# Patient Record
Sex: MALE | Race: OTHER | URBAN - METROPOLITAN AREA
[De-identification: names, ages, dates, MRNs, and addresses within clinical notes are randomized per-mention and may not be internally consistent; named-entity substitution may affect disease eponyms.]

---

## 2017-07-13 ENCOUNTER — IMPORTED ENCOUNTER (OUTPATIENT)
Dept: URBAN - METROPOLITAN AREA CLINIC 50 | Facility: CLINIC | Age: 82
End: 2017-07-13

## 2017-07-17 ENCOUNTER — IMPORTED ENCOUNTER (OUTPATIENT)
Dept: URBAN - METROPOLITAN AREA CLINIC 50 | Facility: CLINIC | Age: 82
End: 2017-07-17

## 2018-09-20 ENCOUNTER — IMPORTED ENCOUNTER (OUTPATIENT)
Dept: URBAN - METROPOLITAN AREA CLINIC 50 | Facility: CLINIC | Age: 83
End: 2018-09-20

## 2018-09-27 ENCOUNTER — IMPORTED ENCOUNTER (OUTPATIENT)
Dept: URBAN - METROPOLITAN AREA CLINIC 50 | Facility: CLINIC | Age: 83
End: 2018-09-27

## 2018-10-12 ENCOUNTER — IMPORTED ENCOUNTER (OUTPATIENT)
Dept: URBAN - METROPOLITAN AREA CLINIC 50 | Facility: CLINIC | Age: 83
End: 2018-10-12

## 2018-10-17 ENCOUNTER — IMPORTED ENCOUNTER (OUTPATIENT)
Dept: URBAN - METROPOLITAN AREA CLINIC 50 | Facility: CLINIC | Age: 83
End: 2018-10-17

## 2018-10-17 NOTE — PATIENT DISCUSSION
"""S/P IOL OD: Tecnis [id number] 21.5 (ORA) (Target: 180) +ORA/Femto/Arcs +Omidria. Continue post operative instructions and drops per schedule.  """

## 2018-10-26 ENCOUNTER — IMPORTED ENCOUNTER (OUTPATIENT)
Dept: URBAN - METROPOLITAN AREA CLINIC 50 | Facility: CLINIC | Age: 83
End: 2018-10-26

## 2018-10-29 ENCOUNTER — IMPORTED ENCOUNTER (OUTPATIENT)
Dept: URBAN - METROPOLITAN AREA CLINIC 50 | Facility: CLINIC | Age: 83
End: 2018-10-29

## 2018-10-31 ENCOUNTER — IMPORTED ENCOUNTER (OUTPATIENT)
Dept: URBAN - METROPOLITAN AREA CLINIC 50 | Facility: CLINIC | Age: 83
End: 2018-10-31

## 2018-10-31 NOTE — PATIENT DISCUSSION
"""S/P IOL OS: Tecnis [id number] 22 +Femto +TM/Omidria. Continue post operative instructions and drops per schedule.  """

## 2018-11-12 ENCOUNTER — IMPORTED ENCOUNTER (OUTPATIENT)
Dept: URBAN - METROPOLITAN AREA CLINIC 50 | Facility: CLINIC | Age: 83
End: 2018-11-12

## 2018-11-12 NOTE — PATIENT DISCUSSION
"""S/P IOL OS: Tecnis [id number] 22 +Femto +TM/Omidria. Continue post operative instructions and drops per schedule.  """

## 2018-12-03 ENCOUNTER — IMPORTED ENCOUNTER (OUTPATIENT)
Dept: URBAN - METROPOLITAN AREA CLINIC 50 | Facility: CLINIC | Age: 83
End: 2018-12-03

## 2018-12-03 NOTE — PATIENT DISCUSSION
"""S/P IOL OU: OD: Tecnis [id number] 21.5 (ORA) (Target: 180) +ORAFemto/ArcsOmidria. OS: Tecnis [id number] 22 +TM. MRx given today. "

## 2019-02-04 ENCOUNTER — IMPORTED ENCOUNTER (OUTPATIENT)
Dept: URBAN - METROPOLITAN AREA CLINIC 50 | Facility: CLINIC | Age: 84
End: 2019-02-04

## 2019-06-21 ENCOUNTER — IMPORTED ENCOUNTER (OUTPATIENT)
Dept: URBAN - METROPOLITAN AREA CLINIC 50 | Facility: CLINIC | Age: 84
End: 2019-06-21

## 2019-12-11 ENCOUNTER — IMPORTED ENCOUNTER (OUTPATIENT)
Dept: URBAN - METROPOLITAN AREA CLINIC 50 | Facility: CLINIC | Age: 84
End: 2019-12-11

## 2020-07-10 ENCOUNTER — IMPORTED ENCOUNTER (OUTPATIENT)
Dept: URBAN - METROPOLITAN AREA CLINIC 50 | Facility: CLINIC | Age: 85
End: 2020-07-10

## 2020-07-10 NOTE — PATIENT DISCUSSION
"""Will refer to [facility] for consult. Monitor ERM for changes.  Informed patient of potential for ""

## 2021-04-18 ASSESSMENT — VISUAL ACUITY
OS_CC: 20/70
OS_CC: 20/70-
OD_CC: J2
OD_CC: J16
OD_CC: 20/40
OS_BAT: 20/100
OS_OTHER: 20/200.
OS_BAT: 20/100
OS_PH: 20/50+
OD_CC: 20/60+1
OS_BAT: 20/200
OD_PH: 20/30-
OD_SC: 20/400
OS_CC: J16
OS_CC: J2
OS_CC: 20/40-
OS_BAT: 20/80
OD_SC: 20/50+1
OS_CC: J2
OD_CC: J2
OS_CC: 20/50
OD_BAT: 20/70
OS_CC: J2-
OS_BAT: 20/100
OD_BAT: 20/100
OD_CC: 20/40
OD_CC: 20/70
OD_OTHER: 20/80. >20/400.
OD_CC: J2-
OD_CC: 20/40-2
OD_OTHER: 20/100. 20/400-.
OS_CC: CF 4 FT
OS_SC: 20/80-
OD_SC: 20/30-1
OS_OTHER: 20/100. 20/100.
OS_CC: 20/70-
OS_CC: 20/40-1
OD_OTHER: 20/100. 20/400-.
OD_CC: J2
OD_SC: 20/50-+
OD_PH: 20/40-
OS_OTHER: 20/100. 20/100.
OD_BAT: 20/80
OD_OTHER: 20/100.
OS_OTHER: 20/80. >20/400.
OD_BAT: 20/100
OS_SC: 20/60+
OD_CC: 20/60+
OS_OTHER: 20/100. 20/400-.
OD_BAT: 20/100
OD_OTHER: 20/70. 20/100.
OS_PH: 20/40
OS_CC: J2

## 2021-04-18 ASSESSMENT — PACHYMETRY
OS_CT_UM: 692
OD_CT_UM: 686
OS_CT_UM: 692
OD_CT_UM: 686
OS_CT_UM: 692
OD_CT_UM: 686
OS_CT_UM: 692
OD_CT_UM: 686
OD_CT_UM: 686

## 2021-04-18 ASSESSMENT — TONOMETRY
OD_IOP_MMHG: 17
OS_IOP_MMHG: 8
OD_IOP_MMHG: 17
OS_IOP_MMHG: 17
OD_IOP_MMHG: 61
OD_IOP_MMHG: 7
OD_IOP_MMHG: 65
OS_IOP_MMHG: 8
OD_IOP_MMHG: 15
OS_IOP_MMHG: 20
OS_IOP_MMHG: 13
OS_IOP_MMHG: 17
OD_IOP_MMHG: 16
OS_IOP_MMHG: 13
OD_IOP_MMHG: 22
OD_IOP_MMHG: 16
OS_IOP_MMHG: 26
OS_IOP_MMHG: 48
OD_IOP_MMHG: 21
OS_IOP_MMHG: 12
OS_IOP_MMHG: 16
OS_IOP_MMHG: 20
OS_IOP_MMHG: 44
OS_IOP_MMHG: 17
OD_IOP_MMHG: 11
OD_IOP_MMHG: 12
OD_IOP_MMHG: 12
OS_IOP_MMHG: 18
OD_IOP_MMHG: 18
OD_IOP_MMHG: 13
OS_IOP_MMHG: 16
OD_IOP_MMHG: 18
OS_IOP_MMHG: 22
OD_IOP_MMHG: 22
OD_IOP_MMHG: 18
OS_IOP_MMHG: 12
OD_IOP_MMHG: 11
OS_IOP_MMHG: 14
OS_IOP_MMHG: 13
OS_IOP_MMHG: 16